# Patient Record
Sex: MALE | Race: BLACK OR AFRICAN AMERICAN | NOT HISPANIC OR LATINO
[De-identification: names, ages, dates, MRNs, and addresses within clinical notes are randomized per-mention and may not be internally consistent; named-entity substitution may affect disease eponyms.]

---

## 2017-05-24 ENCOUNTER — NON-APPOINTMENT (OUTPATIENT)
Age: 57
End: 2017-05-24

## 2017-05-24 ENCOUNTER — APPOINTMENT (OUTPATIENT)
Dept: CARDIOLOGY | Facility: CLINIC | Age: 57
End: 2017-05-24

## 2017-05-24 VITALS
SYSTOLIC BLOOD PRESSURE: 143 MMHG | HEART RATE: 61 BPM | HEIGHT: 72 IN | OXYGEN SATURATION: 98 % | DIASTOLIC BLOOD PRESSURE: 87 MMHG | BODY MASS INDEX: 36.84 KG/M2 | WEIGHT: 272 LBS

## 2017-05-24 DIAGNOSIS — R01.1 CARDIAC MURMUR, UNSPECIFIED: ICD-10-CM

## 2017-05-24 DIAGNOSIS — E78.5 HYPERLIPIDEMIA, UNSPECIFIED: ICD-10-CM

## 2017-05-24 DIAGNOSIS — I48.0 PAROXYSMAL ATRIAL FIBRILLATION: ICD-10-CM

## 2020-12-01 ENCOUNTER — LABORATORY RESULT (OUTPATIENT)
Age: 60
End: 2020-12-01

## 2020-12-01 ENCOUNTER — APPOINTMENT (OUTPATIENT)
Dept: DISASTER EMERGENCY | Facility: CLINIC | Age: 60
End: 2020-12-01

## 2020-12-03 ENCOUNTER — TRANSCRIPTION ENCOUNTER (OUTPATIENT)
Age: 60
End: 2020-12-03

## 2020-12-04 ENCOUNTER — OUTPATIENT (OUTPATIENT)
Dept: OUTPATIENT SERVICES | Facility: HOSPITAL | Age: 60
LOS: 1 days | End: 2020-12-04
Payer: COMMERCIAL

## 2020-12-04 DIAGNOSIS — Z12.11 ENCOUNTER FOR SCREENING FOR MALIGNANT NEOPLASM OF COLON: ICD-10-CM

## 2020-12-04 PROCEDURE — G0121: CPT

## 2022-02-21 ENCOUNTER — RESULT REVIEW (OUTPATIENT)
Age: 62
End: 2022-02-21

## 2022-04-05 ENCOUNTER — APPOINTMENT (OUTPATIENT)
Dept: ORTHOPEDIC SURGERY | Facility: CLINIC | Age: 62
End: 2022-04-05
Payer: COMMERCIAL

## 2022-04-05 VITALS — WEIGHT: 270 LBS | HEIGHT: 73 IN | BODY MASS INDEX: 35.78 KG/M2

## 2022-04-05 DIAGNOSIS — I10 ESSENTIAL (PRIMARY) HYPERTENSION: ICD-10-CM

## 2022-04-05 PROCEDURE — 99024 POSTOP FOLLOW-UP VISIT: CPT

## 2022-04-05 NOTE — REASON FOR VISIT
[FreeTextEntry2] : Patient Complaint - 5/16/19: L heel pain ongoing since 2017. saw dr starks where he had PT/Injections with mild\par relief.\par 7/18/19 f/u L Achilles HEP would like to restart PT\par 1/13/22: bilateral achilles pain Left>Right worsening over years. Pain worse with activities. He has tried conservative\par measures previously, and is considering surgery.\par 1/13/22: bilateral achilles pain \par 1/21/22 L > R achilles pain Would like to schedule surgery\par 2/21/22 L partial calcanectomy/excision of leg mass, achilles tendon repair with FHL transfer\par \par 3/1/22: f/u L achilles. NWB splint\par 3/10/22: f/u L achilles, WB in cam boot\par 4/5/22:  f/u L achilles  wb in cam boot  PT

## 2022-04-05 NOTE — PHYSICAL EXAM
[Left] : left foot and ankle [Moderate] : moderate diffused ankle swelling [2+] : posterior tibialis pulse: 2+ [] : no calf tenderness [de-identified] : One crutch [de-identified] : plantar flexion 15 degrees [de-identified] : inversion 20 degrees [de-identified] : eversion 35 degrees [TWNoteComboBox7] : dorsiflexion 5 degrees

## 2022-04-05 NOTE — REVIEW OF SYSTEMS
[Arthralgia] : arthralgia [Joint Pain] : joint pain [Joint Swelling] : joint swelling [Negative] : Heme/Lymph [Lower Ext Edema] : no lower extremity edema [FreeTextEntry5] : HTN, hyperlipedemia

## 2022-04-05 NOTE — HISTORY OF PRESENT ILLNESS
[Left Leg] : left leg [2] : 2 [1] : 2 [Dull/Aching] : dull/aching [Localized] : localized [Frequent] : frequent [Leisure] : leisure [Rest] : rest [Standing] : standing [Walking] : walking [Stairs] : stairs [de-identified] : 5/16/19: L heel pain ongoing since 2017. saw dr starks where he had PT/Injections with mild relief.\par 7/18/19 f/u L Achilles HEP would like to restart PT\par 1/13/22: bilateral achilles pain Left>Right worsening over years. Pain worse with activities. He has tried conservative measures previously, and is considering surgery.\par 1/13/22: bilateral achilles pain \par 1/21/22 L > R achilles pain Would like to schedule surgery\par 2/21/22; L partial calcanectomy/excision of leg mass, achilles tendon repair with FHL transfer\par 3/1/22: f/u L achilles. NWB splint\par 3/10/22: f/u L achilles, WB in cam boot\par 4/5/22: F/U L achilles, WB in cam boot [] : no [FreeTextEntry1] : achilles [de-identified] : XR [de-identified] : 2/21/22 [de-identified] : L partial calcanectomy/excision of leg mass, achilles tendon repair with FHL transfer

## 2022-06-09 ENCOUNTER — APPOINTMENT (OUTPATIENT)
Dept: ORTHOPEDIC SURGERY | Facility: CLINIC | Age: 62
End: 2022-06-09
Payer: COMMERCIAL

## 2022-06-09 VITALS — HEIGHT: 73 IN | BODY MASS INDEX: 34.19 KG/M2 | WEIGHT: 258 LBS

## 2022-06-09 PROCEDURE — 99213 OFFICE O/P EST LOW 20 MIN: CPT

## 2022-06-09 NOTE — PHYSICAL EXAM
[2+] : posterior tibialis pulse: 2+ [Left] : left foot and ankle [] : no calf tenderness [de-identified] : One crutch [de-identified] : plantar flexion 15 degrees [de-identified] : inversion 20 degrees [de-identified] : eversion 35 degrees [TWNoteComboBox7] : dorsiflexion 5 degrees

## 2022-06-09 NOTE — RETURN TO WORK/SCHOOL
[Return Date: _____] : as of [unfilled].  This has been discussed in detail with ~Maikel~ and ~he/she~ understands this. [Full Duty] : full duty

## 2022-06-09 NOTE — REVIEW OF SYSTEMS
[Arthralgia] : arthralgia [Joint Pain] : joint pain [Joint Swelling] : joint swelling [Negative] : Heme/Lymph [Joint Stiffness] : no joint stiffness [Lower Ext Edema] : no lower extremity edema [FreeTextEntry5] : HTN, hyperlipedemia

## 2022-06-09 NOTE — HISTORY OF PRESENT ILLNESS
[Left Leg] : left leg [Localized] : localized [Frequent] : frequent [Leisure] : leisure [Rest] : rest [Standing] : standing [Walking] : walking [Stairs] : stairs [Burning] : burning [2] : 2 [1] : 2 [Dull/Aching] : dull/aching [] : This patient has had an injection before: no [FreeTextEntry1] : achilles [FreeTextEntry5] : pt is a 62 y/o male in for eval of the L Achilles pt states visit is S/P SX pt states recovery is progressing well  [de-identified] : XR [de-identified] : PT 2x wkly  [de-identified] : 2/21/22 [de-identified] : L partial calcanectomy/excision of leg mass, achilles tendon repair with FHL transfer [de-identified] : 5/16/19: L heel pain ongoing since 2017. saw dr starks where he had PT/Injections with mild relief.\par 7/18/19 f/u L Achilles HEP would like to restart PT\par 1/13/22: bilateral achilles pain Left>Right worsening over years. Pain worse with activities. He has tried conservative measures previously, and is considering surgery.\par 1/13/22: bilateral achilles pain \par 1/21/22 L > R achilles pain Would like to schedule surgery\par 2/21/22; L partial calcanectomy/excision of leg mass, achilles tendon repair with FHL transfer\par 3/1/22: f/u L achilles. NWB splint\par 3/10/22: f/u L achilles, WB in cam boot\par 4/5/22: F/U L achilles, WB in cam boot

## 2022-07-21 ENCOUNTER — APPOINTMENT (OUTPATIENT)
Dept: ORTHOPEDIC SURGERY | Facility: CLINIC | Age: 62
End: 2022-07-21

## 2022-07-21 DIAGNOSIS — S86.012D STRAIN OF LEFT ACHILLES TENDON, SUBSEQUENT ENCOUNTER: ICD-10-CM

## 2022-07-21 PROCEDURE — 99213 OFFICE O/P EST LOW 20 MIN: CPT

## 2022-07-21 NOTE — HISTORY OF PRESENT ILLNESS
[Left Leg] : left leg [2] : 2 [1] : 2 [Burning] : burning [Dull/Aching] : dull/aching [Localized] : localized [Frequent] : frequent [Leisure] : leisure [Rest] : rest [Standing] : standing [Walking] : walking [Stairs] : stairs [] : This patient has had an injection before: no [FreeTextEntry1] : achilles [FreeTextEntry5] : pt is a 62 y/o male in for eval of the L Achilles pt states visit is S/P SX pt states recovery is progressing well  [de-identified] : XR [de-identified] : PT 2x wkly  [de-identified] : 2/21/22 [de-identified] : L partial calcanectomy/excision of leg mass, achilles tendon repair with FHL transfer [de-identified] : 5/16/19: L heel pain ongoing since 2017. saw dr starks where he had PT/Injections with mild relief.\par 7/18/19 f/u L Achilles HEP would like to restart PT\par 1/13/22: bilateral achilles pain Left>Right worsening over years. Pain worse with activities. He has tried conservative measures previously, and is considering surgery.\par 1/13/22: bilateral achilles pain \par 1/21/22 L > R achilles pain Would like to schedule surgery\par 2/21/22; L partial calcanectomy/excision of leg mass, achilles tendon repair with FHL transfer\par 3/1/22: f/u L achilles. NWB splint\par 3/10/22: f/u L achilles, WB in cam boot\par 4/5/22: F/U L achilles, WB in cam boot

## 2022-07-21 NOTE — PHYSICAL EXAM
[Left] : left foot and ankle [2+] : posterior tibialis pulse: 2+ [] : no calf tenderness [de-identified] : One crutch [de-identified] : plantar flexion 15 degrees [de-identified] : inversion 20 degrees [de-identified] : eversion 35 degrees [TWNoteComboBox7] : dorsiflexion 5 degrees

## 2022-08-16 ENCOUNTER — APPOINTMENT (OUTPATIENT)
Dept: ORTHOPEDIC SURGERY | Facility: CLINIC | Age: 62
End: 2022-08-16

## 2022-09-01 ENCOUNTER — APPOINTMENT (OUTPATIENT)
Dept: ORTHOPEDIC SURGERY | Facility: CLINIC | Age: 62
End: 2022-09-01

## 2022-09-15 ENCOUNTER — APPOINTMENT (OUTPATIENT)
Dept: ORTHOPEDIC SURGERY | Facility: CLINIC | Age: 62
End: 2022-09-15

## 2022-09-15 VITALS — HEIGHT: 73 IN | BODY MASS INDEX: 34.46 KG/M2 | WEIGHT: 260 LBS

## 2022-09-15 DIAGNOSIS — M92.62 JUVENILE OSTEOCHONDROSIS OF TARSUS, LEFT ANKLE: ICD-10-CM

## 2022-09-15 PROCEDURE — 99213 OFFICE O/P EST LOW 20 MIN: CPT

## 2022-09-15 NOTE — HISTORY OF PRESENT ILLNESS
[4] : 4 [2] : 2 [Dull/Aching] : dull/aching [Localized] : localized [de-identified] :  5/16/19: L heel pain ongoing since 2017. saw dr starks where he had PT/Injections with mild\par relief.\par 7/18/19 f/u L Achilles HEP would like to restart PT\par 1/13/22: bilateral achilles pain Left>Right worsening over years. Pain worse with activities. He has tried conservative\par measures previously, and is considering surgery.\par 1/13/22: bilateral achilles pain \par 1/21/22 L > R achilles pain Would like to schedule surgery\par \par 2/21/22 L partial calcanectomy/excision of leg mass, achilles tendon repair with FHL transfer\par \par 3/1/22: f/u L achilles. NWB splint\par 3/10/22: f/u L achilles, WB in cam boot\par 4/5/22: f/u L achilles wb in cam boot PT. \par 6/9/22: f/u L achilles, improving with PT. 80% imroved. He would like to return to work. \par 7/21/22  f/u L achilles  HEP  PT auth pending\par 9/15/22  f/u L achilles  HEP  was better w/PT [] : Post Surgical Visit: no [FreeTextEntry1] : L Ankle [FreeTextEntry3] : N./A Chronic [FreeTextEntry5] : 60 Y/O RHD M Eval L ankle NKI Chronic pain pt states improvement in condition since last visit  [de-identified] : Home Exercise  [de-identified] : [employer]

## 2022-09-15 NOTE — REVIEW OF SYSTEMS
[Joint Pain] : joint pain [Joint Swelling] : joint swelling [Negative] : Heme/Lymph [Arthralgia] : arthralgia [Joint Stiffness] : no joint stiffness [Lower Ext Edema] : no lower extremity edema [FreeTextEntry5] : HTN, hyperlipedemia

## 2022-09-15 NOTE — PHYSICAL EXAM
[Left] : left foot and ankle [2+] : posterior tibialis pulse: 2+ [] : no calf tenderness [FreeTextEntry3] : skin dry [de-identified] : One crutch [de-identified] : plantar flexion 15 degrees [de-identified] : inversion 20 degrees [de-identified] : eversion 35 degrees [TWNoteComboBox7] : dorsiflexion 5 degrees

## 2022-09-20 ENCOUNTER — RESULT REVIEW (OUTPATIENT)
Age: 62
End: 2022-09-20

## 2022-09-20 ENCOUNTER — APPOINTMENT (OUTPATIENT)
Dept: ORTHOPEDIC SURGERY | Facility: CLINIC | Age: 62
End: 2022-09-20

## 2022-09-20 VITALS — HEIGHT: 73 IN | BODY MASS INDEX: 34.46 KG/M2 | WEIGHT: 260 LBS

## 2022-09-20 DIAGNOSIS — M71.21 SYNOVIAL CYST OF POPLITEAL SPACE [BAKER], RIGHT KNEE: ICD-10-CM

## 2022-09-20 PROCEDURE — 99214 OFFICE O/P EST MOD 30 MIN: CPT

## 2022-09-20 PROCEDURE — 73562 X-RAY EXAM OF KNEE 3: CPT | Mod: RT

## 2022-09-20 NOTE — DISCUSSION/SUMMARY
[de-identified] : The patient will have venous Doppler today.  I did explain to him that a positive for DVT, this can be potentially life-threatening\par If popliteal cyst is confirmed, discussed possible aspiration and injection of cortisone by interventional radiologist.  Risk benefits and the alternatives were discussed \par Ice p.r.n.\par Continue Aleve p.r.n.\par \par Impression:\par Moderate osteoarthritis right knee.  Rule out popliteal cyst, DVT

## 2022-09-20 NOTE — IMAGING
[de-identified] : Slight right antalgic gait\par \par Right knee\par No swelling\par Mild medial facet tenderness.  Mild medial joint line tenderness.  Mild tenderness popliteal region with question of mild fluctuance\par Passive range of motion 0° to 125°\par Ligaments are stable\par Quad strength 5/5\par \par Right leg\par No swelling\par Calf is soft and nontender\par Posterior tibial pulse 2+ [Right] : right knee [FreeTextEntry9] : Reviewed and interpreted.  Right knee AP standing, lateral, sunrise views-moderate degenerative changes with narrowing of the medial compartment on AP standing view, spurring patellofemoral joint

## 2022-09-20 NOTE — HISTORY OF PRESENT ILLNESS
[Gradual] : gradual [8] : 8 [Radiating] : radiating [Sharp] : sharp [Intermittent] : intermittent [Leisure] : leisure [Rest] : rest [Standing] : standing [Full time] : Work status: full time [de-identified] : Patient is a 61-year-old male with pain right knee over the past month.  Pain is in his posterior knee.  He has mild pain standing and walking.  Pain with stairs and movie sign.  Slight swelling.  Taking Aleve p.r.n.  He is recovering from left ankle surgery by Dr. Swan 6 months ago and has been putting increased pressure on his right leg [] : Post Surgical Visit: no

## 2022-09-21 ENCOUNTER — NON-APPOINTMENT (OUTPATIENT)
Age: 62
End: 2022-09-21

## 2022-09-27 ENCOUNTER — RESULT CHARGE (OUTPATIENT)
Age: 62
End: 2022-09-27

## 2022-09-28 ENCOUNTER — NON-APPOINTMENT (OUTPATIENT)
Age: 62
End: 2022-09-28

## 2022-09-28 PROBLEM — M71.21 SYNOVIAL CYST OF RIGHT POPLITEAL SPACE: Status: ACTIVE | Noted: 2022-09-28

## 2022-10-04 ENCOUNTER — RESULT REVIEW (OUTPATIENT)
Age: 62
End: 2022-10-04

## 2022-10-16 ENCOUNTER — NON-APPOINTMENT (OUTPATIENT)
Age: 62
End: 2022-10-16

## 2022-11-03 ENCOUNTER — APPOINTMENT (OUTPATIENT)
Dept: ORTHOPEDIC SURGERY | Facility: CLINIC | Age: 62
End: 2022-11-03

## 2022-11-07 ENCOUNTER — APPOINTMENT (OUTPATIENT)
Dept: ORTHOPEDIC SURGERY | Facility: CLINIC | Age: 62
End: 2022-11-07

## 2022-11-28 ENCOUNTER — FORM ENCOUNTER (OUTPATIENT)
Age: 62
End: 2022-11-28

## 2022-11-28 ENCOUNTER — APPOINTMENT (OUTPATIENT)
Dept: ORTHOPEDIC SURGERY | Facility: CLINIC | Age: 62
End: 2022-11-28

## 2022-11-29 ENCOUNTER — APPOINTMENT (OUTPATIENT)
Dept: MRI IMAGING | Facility: CLINIC | Age: 62
End: 2022-11-29

## 2022-11-29 PROCEDURE — 73721 MRI JNT OF LWR EXTRE W/O DYE: CPT | Mod: RT

## 2022-11-30 ENCOUNTER — NON-APPOINTMENT (OUTPATIENT)
Age: 62
End: 2022-11-30

## 2022-12-06 ENCOUNTER — APPOINTMENT (OUTPATIENT)
Dept: ORTHOPEDIC SURGERY | Facility: CLINIC | Age: 62
End: 2022-12-06

## 2022-12-06 VITALS — HEIGHT: 73 IN | WEIGHT: 260 LBS | BODY MASS INDEX: 34.46 KG/M2

## 2022-12-06 DIAGNOSIS — S83.231D COMPLEX TEAR OF MEDIAL MENISCUS, CURRENT INJURY, RIGHT KNEE, SUBSEQUENT ENCOUNTER: ICD-10-CM

## 2022-12-06 DIAGNOSIS — R93.6 ABNORMAL FINDINGS ON DIAGNOSTIC IMAGING OF LIMBS: ICD-10-CM

## 2022-12-06 DIAGNOSIS — S83.271D COMPLEX TEAR OF LATERAL MENISCUS, CURRENT INJURY, RIGHT KNEE, SUBSEQUENT ENCOUNTER: ICD-10-CM

## 2022-12-06 PROCEDURE — 99214 OFFICE O/P EST MOD 30 MIN: CPT

## 2022-12-06 RX ORDER — HYDROCHLOROTHIAZIDE 25 MG/1
25 TABLET ORAL
Qty: 90 | Refills: 0 | Status: ACTIVE | COMMUNITY
Start: 2022-11-17

## 2022-12-06 RX ORDER — AMLODIPINE BESYLATE 10 MG/1
10 TABLET ORAL
Qty: 90 | Refills: 0 | Status: ACTIVE | COMMUNITY
Start: 2022-07-07

## 2022-12-06 NOTE — DATA REVIEWED
[MRI] : MRI [Right] : of the right [Knee] : knee [I independently reviewed and interpreted images and report] : I independently reviewed and interpreted images and report [FreeTextEntry1] : MRI right knee done November 29, 2022 was reviewed. There are tricompartmental degenerative changes. Complex tears medial and lateral meniscus. Mild increase signal changes distal quadriceps and proximal patella tendons. Moderate effusion \par

## 2022-12-06 NOTE — HISTORY OF PRESENT ILLNESS
[Gradual] : gradual [0] : 0 [Dull/Aching] : dull/aching [Full time] : Work status: full time [de-identified] : The patient says he is feeling significantly better.  He has been doing home exercises.  He has mild occasional pain right knee with activities.  No buckling locking or swelling.  Taking Aleve p.r.n.  He had MRI right knee [] : Post Surgical Visit: no [de-identified] : 09/20/22 [de-identified] : Dr. Wells

## 2022-12-06 NOTE — DISCUSSION/SUMMARY
[de-identified] : MRI results right knee were discussed with the patient\par Continue home exercises\par The patient has previously had Euflexxa injections in his left knee with good response.  I discussed trying this for his right knee if his symptoms worsen and gave him a pamphlet.  Risks benefits and the alternatives were discussed\par Continue ice p.r.n.\par Continue Aleve p.r.n.\par \par Impression:\par Moderate osteoarthritis right knee.  Medial and lateral meniscal tears

## 2022-12-06 NOTE — IMAGING
[de-identified] : Normal gait\par \par Right knee\par No swelling\par Mild medial facet tenderness\par Passive range of motion 0° to 125°\par Ligaments are stable\par Quad strength 5/5\par \par Right leg\par No swelling\par Calf is soft and nontender\par Posterior tibial pulse 2+

## 2022-12-29 ENCOUNTER — APPOINTMENT (OUTPATIENT)
Dept: ORTHOPEDIC SURGERY | Facility: CLINIC | Age: 62
End: 2022-12-29

## 2022-12-29 VITALS — BODY MASS INDEX: 34.46 KG/M2 | HEIGHT: 73 IN | WEIGHT: 260 LBS

## 2022-12-29 DIAGNOSIS — M17.11 UNILATERAL PRIMARY OSTEOARTHRITIS, RIGHT KNEE: ICD-10-CM

## 2022-12-29 PROCEDURE — 99213 OFFICE O/P EST LOW 20 MIN: CPT | Mod: 25

## 2022-12-29 PROCEDURE — 20611 DRAIN/INJ JOINT/BURSA W/US: CPT | Mod: RT

## 2022-12-29 RX ORDER — METHYLPREDNISOLONE ACETATE 80 MG/ML
80 INJECTION, SUSPENSION INTRA-ARTICULAR; INTRALESIONAL; INTRAMUSCULAR; SOFT TISSUE
Refills: 0 | Status: COMPLETED | OUTPATIENT
Start: 2022-12-29

## 2022-12-29 RX ADMIN — METHYLPREDNISOLONE ACETATE MG/ML: 80 INJECTION, SUSPENSION INTRA-ARTICULAR; INTRALESIONAL; INTRAMUSCULAR; SOFT TISSUE at 00:00

## 2022-12-29 NOTE — IMAGING
[de-identified] : Normal gait\par \par Right knee\par No swelling\par Mild medial facet and joint line tenderness\par Passive range of motion 0° to 125°\par Ligaments are stable\par Quad strength 5/5\par \par Right leg\par No swelling\par Calf is soft and nontender\par Posterior tibial pulse 2+

## 2022-12-29 NOTE — DISCUSSION/SUMMARY
[de-identified] : I offered him a cortisone injection right knee today.  Risks benefits and the alternatives were discussed\par Continue home exercises\par The patient has previously had Euflexxa injections in his left knee with good response. Again, I discussed possibly trying this for his right knee.  Risks benefits and the alternatives were discussed\par Continue ice p.r.n.\par Continue Aleve p.r.n.\par \par Impression:\par Moderate osteoarthritis right knee.  Medial and lateral meniscal tears

## 2022-12-29 NOTE — PROCEDURE
[Large Joint Injection] : Large joint injection [Right] : of the right [Knee] : knee [Pain] : pain [Alcohol] : alcohol [Betadine] : betadine [Ethyl Chloride sprayed topically] : ethyl chloride sprayed topically [Sterile technique used] : sterile technique used [___ cc    1%] : Lidocaine ~Vcc of 1%  [___ cc    80mg] : Methylprednisolone (Depomedrol) ~Vcc of 80 mg  [] : Patient tolerated procedure well [Patient was advised to rest the joint(s) for ____ days] : patient was advised to rest the joint(s) for [unfilled] days [Risks, benefits, alternatives discussed / Verbal consent obtained] : the risks benefits, and alternatives have been discussed, and verbal consent was obtained [Altered anatomic landmarks d/t erosive arthritis] : altered anatomic landmarks d/t erosive arthritis [All ultrasound images have been permanently captured and stored accordingly in our picture archiving and communication system] : All ultrasound images have been permanently captured and stored accordingly in our picture archiving and communication system [FreeTextEntry3] : Do not submerge underwater for 24 hours

## 2022-12-29 NOTE — HISTORY OF PRESENT ILLNESS
[Gradual] : gradual [7] : 7 [Sharp] : sharp [Intermittent] : intermittent [Leisure] : leisure [Rest] : rest [Injection therapy] : injection therapy [Stairs] : stairs [Full time] : Work status: full time [de-identified] : The patient has had increased pain right knee over the past couple of weeks.  Mild to moderate pain standing and walking.  Pain with stairs and movie sign [] : Post Surgical Visit: no [de-identified] : 12/6/2022 [de-identified] : Dr. Wells

## 2023-01-05 ENCOUNTER — APPOINTMENT (OUTPATIENT)
Dept: ORTHOPEDIC SURGERY | Facility: CLINIC | Age: 63
End: 2023-01-05

## 2023-01-12 ENCOUNTER — APPOINTMENT (OUTPATIENT)
Dept: ORTHOPEDIC SURGERY | Facility: CLINIC | Age: 63
End: 2023-01-12

## 2025-06-02 ENCOUNTER — APPOINTMENT (OUTPATIENT)
Dept: ORTHOPEDIC SURGERY | Facility: CLINIC | Age: 65
End: 2025-06-02

## 2025-06-02 VITALS — BODY MASS INDEX: 34.46 KG/M2 | HEIGHT: 73 IN | WEIGHT: 260 LBS

## 2025-06-02 DIAGNOSIS — M17.12 UNILATERAL PRIMARY OSTEOARTHRITIS, LEFT KNEE: ICD-10-CM

## 2025-06-02 DIAGNOSIS — M23.204 DERANGEMENT OF UNSPECIFIED MEDIAL MENISCUS DUE TO OLD TEAR OR INJURY, LEFT KNEE: ICD-10-CM

## 2025-06-02 DIAGNOSIS — M25.562 PAIN IN LEFT KNEE: ICD-10-CM

## 2025-06-02 PROCEDURE — 99214 OFFICE O/P EST MOD 30 MIN: CPT | Mod: 25

## 2025-06-02 PROCEDURE — 73562 X-RAY EXAM OF KNEE 3: CPT | Mod: LT

## 2025-06-02 RX ORDER — DICLOFENAC SODIUM 75 MG/1
75 TABLET, DELAYED RELEASE ORAL
Qty: 60 | Refills: 2 | Status: ACTIVE | COMMUNITY
Start: 2025-06-02 | End: 1900-01-01

## 2025-06-21 ENCOUNTER — RESULT REVIEW (OUTPATIENT)
Age: 65
End: 2025-06-21

## 2025-07-02 ENCOUNTER — APPOINTMENT (OUTPATIENT)
Dept: ORTHOPEDIC SURGERY | Facility: CLINIC | Age: 65
End: 2025-07-02

## 2025-07-02 VITALS — BODY MASS INDEX: 34.46 KG/M2 | HEIGHT: 73 IN | WEIGHT: 260 LBS

## 2025-07-02 PROBLEM — Z98.890 HISTORY OF REPAIR OF ACL: Status: ACTIVE | Noted: 2025-07-02

## 2025-07-02 PROCEDURE — 99214 OFFICE O/P EST MOD 30 MIN: CPT
